# Patient Record
Sex: FEMALE | Race: WHITE | NOT HISPANIC OR LATINO | Employment: OTHER | URBAN - METROPOLITAN AREA
[De-identification: names, ages, dates, MRNs, and addresses within clinical notes are randomized per-mention and may not be internally consistent; named-entity substitution may affect disease eponyms.]

---

## 2022-05-13 ENCOUNTER — HOSPITAL ENCOUNTER (EMERGENCY)
Facility: HOSPITAL | Age: 47
Discharge: LEFT BEFORE DISCHARGE | End: 2022-05-13

## 2022-05-13 VITALS
WEIGHT: 102 LBS | OXYGEN SATURATION: 98 % | HEART RATE: 70 BPM | RESPIRATION RATE: 18 BRPM | SYSTOLIC BLOOD PRESSURE: 137 MMHG | DIASTOLIC BLOOD PRESSURE: 94 MMHG | TEMPERATURE: 98 F | HEIGHT: 63 IN | BODY MASS INDEX: 18.07 KG/M2

## 2022-05-13 PROCEDURE — 99211 OFF/OP EST MAY X REQ PHY/QHP: CPT

## 2022-06-29 ENCOUNTER — PREP FOR SURGERY (OUTPATIENT)
Dept: SURGERY | Facility: SURGERY CENTER | Age: 47
End: 2022-06-29

## 2022-06-29 ENCOUNTER — OFFICE VISIT (OUTPATIENT)
Dept: GASTROENTEROLOGY | Facility: CLINIC | Age: 47
End: 2022-06-29

## 2022-06-29 VITALS
HEIGHT: 63 IN | SYSTOLIC BLOOD PRESSURE: 140 MMHG | WEIGHT: 102.1 LBS | HEART RATE: 73 BPM | TEMPERATURE: 96.9 F | OXYGEN SATURATION: 97 % | BODY MASS INDEX: 18.09 KG/M2 | DIASTOLIC BLOOD PRESSURE: 80 MMHG

## 2022-06-29 DIAGNOSIS — Z12.11 ENCOUNTER FOR SCREENING COLONOSCOPY: ICD-10-CM

## 2022-06-29 DIAGNOSIS — Z80.0 FAMILY HISTORY OF COLON CANCER IN MOTHER: ICD-10-CM

## 2022-06-29 DIAGNOSIS — R63.4 WEIGHT LOSS: ICD-10-CM

## 2022-06-29 DIAGNOSIS — R63.4 WEIGHT LOSS: Primary | ICD-10-CM

## 2022-06-29 DIAGNOSIS — K92.0 HEMATEMESIS WITH NAUSEA: ICD-10-CM

## 2022-06-29 DIAGNOSIS — K92.0 HEMATEMESIS WITH NAUSEA: Primary | ICD-10-CM

## 2022-06-29 PROCEDURE — 99204 OFFICE O/P NEW MOD 45 MIN: CPT | Performed by: INTERNAL MEDICINE

## 2022-06-29 RX ORDER — SODIUM CHLORIDE, SODIUM LACTATE, POTASSIUM CHLORIDE, CALCIUM CHLORIDE 600; 310; 30; 20 MG/100ML; MG/100ML; MG/100ML; MG/100ML
30 INJECTION, SOLUTION INTRAVENOUS CONTINUOUS PRN
Status: CANCELLED | OUTPATIENT
Start: 2022-06-29

## 2022-06-29 RX ORDER — ALCLOMETASONE DIPROPIONATE 0.5 MG/G
CREAM TOPICAL
COMMUNITY
Start: 2022-04-12

## 2022-06-29 RX ORDER — BACLOFEN 5 MG/1
1 TABLET ORAL EVERY 12 HOURS
COMMUNITY
Start: 2022-05-25

## 2022-06-29 RX ORDER — OMEPRAZOLE 40 MG/1
40 CAPSULE, DELAYED RELEASE ORAL DAILY
Qty: 30 CAPSULE | Refills: 5 | Status: SHIPPED | OUTPATIENT
Start: 2022-06-29 | End: 2022-10-03

## 2022-06-29 RX ORDER — ASPIRIN 81 MG/1
81 TABLET, COATED ORAL DAILY
COMMUNITY
Start: 2022-05-17

## 2022-06-29 RX ORDER — ATORVASTATIN CALCIUM 40 MG/1
40 TABLET, FILM COATED ORAL
COMMUNITY
Start: 2022-05-17

## 2022-06-29 RX ORDER — SODIUM CHLORIDE 0.9 % (FLUSH) 0.9 %
10 SYRINGE (ML) INJECTION AS NEEDED
Status: CANCELLED | OUTPATIENT
Start: 2022-06-29

## 2022-06-29 RX ORDER — SODIUM CHLORIDE 0.9 % (FLUSH) 0.9 %
3 SYRINGE (ML) INJECTION EVERY 12 HOURS SCHEDULED
Status: CANCELLED | OUTPATIENT
Start: 2022-06-29

## 2022-06-29 NOTE — PATIENT INSTRUCTIONS
For further evaluation of weight loss, hematemesis, family history of colon cancer in your mother we will schedule an EGD and colonoscopy.     2.  For abdominal pain/epigastric pain we will place you omeprazole 40 mg once daily. This prescription has been sent to your pharmacy.     3.  Plan for office follow up 4 weeks after procedure.

## 2022-06-29 NOTE — PROGRESS NOTES
"Chief Complaint   Patient presents with   • Weight Loss   • hematemesis           History of Present Illness  Patient for consultation regarding hematemesis and weight loss which is new and unexplained. She reports losing 50 lbs in the past 1-1.5 months. She reports having a MI and COVID. She reports hematemesis and was not responsive and was taken to the hospital for evaluation. She denies any melena or hematochezia.     She does not have an appetite. Food makes her sick to her stomach. The past 2-3 days she has been able to eat. She reports some epigastric pain that can be severe at times. She feels like her \"stomach is turning upside down\". She reports taking a PPI in the past which helped her at that time.     She has never had a colonoscopy. She reports an aunt with a history of colon cancer. She reports a history of Crohn's disease and colon cancer in her mother. She reports a personal history of Stage 3 breast cancer in 2012.     She also reports RLQ abdominal pain.        Result Review :       XR CHEST 1 VW PORTABLE (08/04/2021 20:25)  PROGRESS NOTES - SCAN by [employer], [employer] (05/25/2022 00:00)  PROGRESS NOTES - SCAN by [employer] (03/18/2022 00:00)      Vital Signs:   /80   Pulse 73   Temp 96.9 °F (36.1 °C)   Ht 160 cm (63\")   Wt 46.3 kg (102 lb 1.6 oz)   SpO2 97%   BMI 18.09 kg/m²     Body mass index is 18.09 kg/m².     Physical Exam  Vitals reviewed.   Constitutional:       Appearance: Normal appearance.   Abdominal:      General: Bowel sounds are normal. There is no distension.      Palpations: Abdomen is soft. Abdomen is not rigid. There is no mass or pulsatile mass.      Tenderness: There is no abdominal tenderness. There is no guarding or rebound.           Assessment and Plan    Diagnoses and all orders for this visit:    1. Hematemesis with nausea (Primary)    2. Weight loss  Comments:  unintentional    3. Encounter for screening colonoscopy    4. Family history of colon " cancer in mother    Other orders  -     omeprazole (priLOSEC) 40 MG capsule; Take 1 capsule by mouth Daily.  Dispense: 30 capsule; Refill: 5       Documentation by Mckenna BYERS acting as a scribe in the following sections (HPI, Assessment, Plan) for the undersigned provider.     BRIEF SUMMARY  Patient here for consultation regarding new unexplained episode of hematemesis with weight loss.  She also is a family history of colon cancer.  We will proceed with an EGD and colonoscopy for further evaluation.  The meantime we will start daily PPI and antireflux measures.    I have reviewed and confirmed the accuracy of the HPI and Assessment and Plan as documented by the APRN ZEESHAN Davey        Follow Up   No follow-ups on file.    Patient Instructions   1. For further evaluation of weight loss, hematemesis, family history of colon cancer in your mother we will schedule an EGD and colonoscopy.     2.  For abdominal pain/epigastric pain we will place you omeprazole 40 mg once daily. This prescription has been sent to your pharmacy.     3.  Plan for office follow up 4 weeks after procedure.

## 2022-07-25 ENCOUNTER — TELEPHONE (OUTPATIENT)
Dept: GASTROENTEROLOGY | Facility: CLINIC | Age: 47
End: 2022-07-25

## 2022-07-25 NOTE — TELEPHONE ENCOUNTER
Patient called stating that she had had her stress test.   There was nothing in chart about stress test

## 2022-09-23 ENCOUNTER — OUTSIDE FACILITY SERVICE (OUTPATIENT)
Dept: GASTROENTEROLOGY | Facility: CLINIC | Age: 47
End: 2022-09-23

## 2022-09-23 ENCOUNTER — PREP FOR SURGERY (OUTPATIENT)
Dept: SURGERY | Facility: SURGERY CENTER | Age: 47
End: 2022-09-23

## 2022-09-23 DIAGNOSIS — Z12.11 ENCOUNTER FOR SCREENING COLONOSCOPY: ICD-10-CM

## 2022-09-23 DIAGNOSIS — Z80.0 FAMILY HISTORY OF COLON CANCER IN MOTHER: Primary | ICD-10-CM

## 2022-09-23 DIAGNOSIS — R63.4 WEIGHT LOSS: ICD-10-CM

## 2022-09-23 PROCEDURE — 43239 EGD BIOPSY SINGLE/MULTIPLE: CPT | Performed by: INTERNAL MEDICINE

## 2022-09-23 RX ORDER — SODIUM CHLORIDE 0.9 % (FLUSH) 0.9 %
10 SYRINGE (ML) INJECTION AS NEEDED
Status: CANCELLED | OUTPATIENT
Start: 2022-09-23

## 2022-09-23 RX ORDER — SODIUM CHLORIDE, SODIUM LACTATE, POTASSIUM CHLORIDE, CALCIUM CHLORIDE 600; 310; 30; 20 MG/100ML; MG/100ML; MG/100ML; MG/100ML
30 INJECTION, SOLUTION INTRAVENOUS CONTINUOUS PRN
Status: CANCELLED | OUTPATIENT
Start: 2022-09-23

## 2022-09-23 RX ORDER — SODIUM CHLORIDE 0.9 % (FLUSH) 0.9 %
3 SYRINGE (ML) INJECTION EVERY 12 HOURS SCHEDULED
Status: CANCELLED | OUTPATIENT
Start: 2022-09-23

## 2022-09-28 ENCOUNTER — TELEPHONE (OUTPATIENT)
Dept: GASTROENTEROLOGY | Facility: CLINIC | Age: 47
End: 2022-09-28

## 2022-09-28 NOTE — TELEPHONE ENCOUNTER
The results from the KRISTI test for H.pylori are in the chart, but we are still awaiting the pathology results for the remaineder of the EGD biopsies. Will contact patient once results are completed and reviewed. damian

## 2022-10-03 DIAGNOSIS — K29.70 GASTRITIS WITHOUT BLEEDING, UNSPECIFIED CHRONICITY, UNSPECIFIED GASTRITIS TYPE: ICD-10-CM

## 2022-10-03 DIAGNOSIS — K20.90 ESOPHAGITIS: Primary | ICD-10-CM

## 2022-10-03 RX ORDER — PANTOPRAZOLE SODIUM 40 MG/1
40 TABLET, DELAYED RELEASE ORAL DAILY
Qty: 90 TABLET | Refills: 3 | Status: SHIPPED | OUTPATIENT
Start: 2022-10-03

## 2023-08-31 ENCOUNTER — OFFICE VISIT (OUTPATIENT)
Dept: CARDIOLOGY | Facility: CLINIC | Age: 48
End: 2023-08-31
Payer: COMMERCIAL

## 2023-08-31 VITALS
RESPIRATION RATE: 18 BRPM | OXYGEN SATURATION: 99 % | HEIGHT: 63 IN | DIASTOLIC BLOOD PRESSURE: 56 MMHG | BODY MASS INDEX: 16.9 KG/M2 | SYSTOLIC BLOOD PRESSURE: 92 MMHG | HEART RATE: 89 BPM | WEIGHT: 95.4 LBS

## 2023-08-31 DIAGNOSIS — R63.4 UNINTENTIONAL WEIGHT LOSS: ICD-10-CM

## 2023-08-31 DIAGNOSIS — Z72.0 TOBACCO USE: ICD-10-CM

## 2023-08-31 DIAGNOSIS — I21.4 NSTEMI, INITIAL EPISODE OF CARE: ICD-10-CM

## 2023-08-31 DIAGNOSIS — I50.21 ACUTE HFREF (HEART FAILURE WITH REDUCED EJECTION FRACTION): Primary | ICD-10-CM

## 2023-08-31 PROCEDURE — 1159F MED LIST DOCD IN RCRD: CPT

## 2023-08-31 PROCEDURE — 1160F RVW MEDS BY RX/DR IN RCRD: CPT

## 2023-08-31 PROCEDURE — 99204 OFFICE O/P NEW MOD 45 MIN: CPT

## 2023-08-31 PROCEDURE — 93000 ELECTROCARDIOGRAM COMPLETE: CPT

## 2023-08-31 RX ORDER — CLOPIDOGREL BISULFATE 75 MG/1
75 TABLET ORAL DAILY
Qty: 90 TABLET | Refills: 0 | Status: SHIPPED | OUTPATIENT
Start: 2023-08-31

## 2023-08-31 RX ORDER — ASPIRIN 81 MG/1
81 TABLET, COATED ORAL DAILY
Qty: 90 TABLET | Refills: 2 | Status: SHIPPED | OUTPATIENT
Start: 2023-08-31

## 2023-08-31 RX ORDER — ATORVASTATIN CALCIUM 40 MG/1
40 TABLET, FILM COATED ORAL NIGHTLY
Qty: 90 TABLET | Refills: 1 | Status: SHIPPED | OUTPATIENT
Start: 2023-08-31

## 2023-08-31 NOTE — PROGRESS NOTES
[facility]  [facility] CARDIOLOGY  [address] [place] [address]  Dept: [phone]  Dept Fax: [phone]    Concepcion Thomas  1975    New Patient Office Note    History of Present Illness:  Concepcion Thomas is a 48 y.o. female who presents to the clinic for Establish Care. She has PMH significant GERD, breast CA with chemotherapy. She is current every day smoker 1-1.5 ppd x 20 yrs, does drink about 1 pint weekly, history of IVDA years ago. Denies significant family history.     She presents today following [place] [facility] [place] [facility], she had elevated troponin's at that time and was sent for heart cath which showed normal coronaries, Echo showed mildly reduced Ef with regional WMA, global hypokinesis. Normal CXR. Otherwise relatively normal labs, UDS positive for opiates with known oral pain medication, - covid test. She was started on medications and discharged to follow cardiology. Today she states she has not had continued reports of chest pain, she states when she did have reports it was mid sternal, sharp, burning occurring at rest and with activity, felt like burning sensation and most recently lasted for hours, she woke from sleep and then noticed the pain come on , associated with vomiting and weakness. She denies all additional complaints, no LE edema, orthopnea, no major complaint of palpitations or SOB. She did state to have NSTEMI about 1 year ago also, did not take medications at that time. She appreciates significant physical and emotional stress recently and has had 50 lbs of unintentional weight loss over the last year which she states has been evaluated but nothing hs been found. She had breast CA with chemo about 10 years ago and states she had normal workup recently. EKG today reveals SR, Hr 70, short WI, T wave abnormality inferior wall, poor R wave septum, without significant change from hospital record. Exam today reveals /65 right,  114/70  left. Today advised it is unlikely her weight loss is coming from cardiac etiology and she should have further workup by PCP. Advised to hydrate well, smoking cessation and decrease Etoh consumption, stress management.She has not started medications ordered at hospital discharge, advised to start medical management.     The following portions of the patient's history were reviewed and updated as appropriate: allergies, current medications, past family history, past medical history, past social history, past surgical history, and problem list.    Medications:  alclometasone  Aspirin Low Dose tablet delayed-release  atorvastatin  Baclofen  clopidogrel  gabapentin  GABAPENTIN PO  HYDROcodone-acetaminophen  metoprolol tartrate  pantoprazole    Subjective  No Known Allergies     Past Medical History:   Diagnosis Date    Cancer     Hypertension     Injury of back     SCIATICA L SIDED       Past Surgical History:   Procedure Laterality Date    BACK SURGERY      BREAST LUMPECTOMY      PORT    BREAST SURGERY       SECTION      TUBAL ABDOMINAL LIGATION         Family History   Problem Relation Age of Onset    Crohn's disease Mother     Colon polyps Mother     Colon cancer Mother     No Known Problems Father     Irritable bowel syndrome Maternal Aunt     Irritable bowel syndrome Maternal Uncle     Ulcerative colitis Neg Hx         Social History     Socioeconomic History    Marital status: Single   Tobacco Use    Smokeless tobacco: Never   Vaping Use    Vaping Use: Never used   Substance and Sexual Activity    Alcohol use: Yes     Comment: OCC    Drug use: Yes     Types: Marijuana    Sexual activity: Defer       Review of Systems   Constitutional:  Positive for fatigue.   Cardiovascular:  Positive for chest pain.   Gastrointestinal:  Positive for nausea.   Psychiatric/Behavioral:  Positive for stress.    All other systems reviewed and are negative.    Cardiovascular Procedures    ECHO/MUGA:   STRESS TESTS:   CARDIAC  "CATH:   DEVICES:   HOLTER:   CT/MRI:   VASCULAR:   CARDIOTHORACIC:     Objective  Vitals:    08/31/23 0854   BP: 92/56   BP Location: Right arm   Patient Position: Sitting   Cuff Size: Adult   Pulse: 89   Resp: 18   SpO2: 99%   Weight: 43.3 kg (95 lb 6.4 oz)   Height: 160 cm (63\")   PainSc:   5   PainLoc: Back       Physical Exam  Vitals reviewed.   Constitutional:       General: Awake.      Appearance: Normal and healthy appearance. Not in distress.   Neck:      Vascular: No JVR. JVD normal.   Pulmonary:      Effort: Pulmonary effort is normal.      Breath sounds: Normal breath sounds. No wheezing. No rhonchi. No rales.   Chest:      Chest wall: Not tender to palpatation.   Cardiovascular:      PMI at left midclavicular line. Normal rate. Regular rhythm. Normal S1. Normal S2.       Murmurs: There is no murmur.      No gallop.  No click. No rub.   Pulses:     Intact distal pulses.   Edema:     Peripheral edema absent.   Abdominal:      General: Bowel sounds are normal.      Palpations: Abdomen is soft.      Tenderness: There is no abdominal tenderness.   Musculoskeletal: Normal range of motion.         General: No tenderness. Skin:     General: Skin is warm and dry.   Neurological:      General: No focal deficit present.      Mental Status: Alert and oriented to person, place and time.   Psychiatric:         Behavior: Behavior is cooperative.        Diagnostic Data    ECG 12 Lead    Date/Time: 8/31/2023 10:31 AM  Performed by: Manju Yap APRN  Authorized by: Manju Yap APRN   Comparison: compared with previous ECG from 8/28/2023  Similar to previous ECG  Rhythm: sinus rhythm  Rate: normal  BPM: 70  QRS axis: normal  Other findings: non-specific ST-T wave changes, low voltage and poor R wave progression    Clinical impression: abnormal EKG      Advance Care Planning        Assessment and Plan  Diagnoses and all orders for this visit:    1. Acute HFrEF (heart failure with reduced ejection " fraction) (Primary)  Recently found at hospital stay, mildly reduced Ef 45%, global hypokinesis. Start metoprolol as prescribed at hospital D/C, will watch and consider additional medications if BP can tolerate.   -     metoprolol tartrate (LOPRESSOR) 25 MG tablet; Take 0.5 tablets by mouth 2 (Two) Times a Day.  Dispense: 90 tablet; Refill: 0    2. NSTEMI, initial episode of care  Elevated troponins, normal coronaries by cath, elevated troponins. Medical management, DAPT x 3 months, statin, toprol.   -     clopidogrel (PLAVIX) 75 MG tablet; Take 1 tablet by mouth Daily.  Dispense: 90 tablet; Refill: 0  -     Aspirin Low Dose 81 MG EC tablet; Take 1 tablet by mouth Daily.  Dispense: 90 tablet; Refill: 2  -     atorvastatin (LIPITOR) 40 MG tablet; Take 1 tablet by mouth Every Night.  Dispense: 90 tablet; Refill: 1    3. Tobacco use  1-1.5 ppd x 20 +, advised cessation, she is not agreeable at this time but will try to decrease.     4. Unintentional weight loss  50 lbs over 1 year, advised to see PCP for further workup.           Return in about 1 month (around 9/30/2023) for Recheck, Manju BYERS.    ZEESHAN Schultz  08/31/2023    Part of this note may be an electronic transcription/translation of spoken language to printed text using the Dragon Dictation System.

## 2023-09-05 ENCOUNTER — TELEPHONE (OUTPATIENT)
Dept: CARDIOLOGY | Facility: CLINIC | Age: 48
End: 2023-09-05
Payer: COMMERCIAL

## 2023-09-05 ENCOUNTER — PATIENT ROUNDING (BHMG ONLY) (OUTPATIENT)
Dept: CARDIOLOGY | Facility: CLINIC | Age: 48
End: 2023-09-05
Payer: COMMERCIAL

## 2023-09-05 NOTE — TELEPHONE ENCOUNTER
Per phone call to pt she had [place] [facility] [place] and would like your input on those.

## 2023-09-05 NOTE — PROGRESS NOTES
September 5, 2023    Hello, may I speak with Concepcion Thomas?    My name is JAEL    I am  with [facility]  [facility] CARDIOLOGY  [address] [place] [address].    Before we get started may I verify your date of birth? 1975    I am calling to officially welcome you to our practice and ask about your recent visit. Is this a good time to talk? yes    Tell me about your visit with us. What things went well?  VERY GOOD, VERY PROFESSIONAL        We're always looking for ways to make our patients' experiences even better. Do you have recommendations on ways we may improve?  no    Overall were you satisfied with your first visit to our practice? yes       I appreciate you taking the time to speak with me today. Is there anything else I can do for you?    YES - WONDERING ABOUT CHOLESTEROL     Thank you, and have a great day.    9/5/23 MG

## 2023-09-06 NOTE — TELEPHONE ENCOUNTER
Phone call to pt to let her know of results and no answer and no voice mail option will retry later.

## 2023-10-11 ENCOUNTER — TELEPHONE (OUTPATIENT)
Dept: CARDIOLOGY | Facility: CLINIC | Age: 48
End: 2023-10-11

## 2023-10-11 DIAGNOSIS — I50.21 ACUTE HFREF (HEART FAILURE WITH REDUCED EJECTION FRACTION): ICD-10-CM

## 2023-10-11 NOTE — TELEPHONE ENCOUNTER
Caller: Concepcion Thomas     Relationship: SELF    Best call back number: [phone] TRY THIS NUMBER FIRST AND IF PT DOES NOT ANSWER CALL THE SECOND ONE   -    [phone]    What is your medical concern? PT BLOOD PRESSURE HAS BEEN ERRATIC FOR THE LAST FEW WEEKS. SHE IS CONCERNED AND WOULD LIKE TO ASK A NURSE WHEN SHE NEEDS TO BE CONCERNED AND GO TO THE DR. HER LAST READING /92 HEART RATE 88, LAST NIGHT IT /90 HEART RATE 125. PLEASE REACH OUT TO PT TO ADVISE.    How long has this issue been going on? A FEW WEEKS NOW    Is your provider already aware of this issue? NO    Have you been treated for this issue? YES

## 2023-10-11 NOTE — TELEPHONE ENCOUNTER
Phone call to pt and she stated she feels fine but want's to see how you feel about it her BP numbers, stated she uses 2 different home machines and will start only using one and I advised she can come in for BP check and bring her home machine but says she may not be able to make it but will keep her appt next week.      PT BLOOD PRESSURE HAS BEEN ERRATIC FOR THE LAST FEW WEEKS. SHE IS CONCERNED AND WOULD LIKE TO ASK A NURSE WHEN SHE NEEDS TO BE CONCERNED AND GO TO THE DR. HER LAST READING /92 HEART RATE 88, LAST NIGHT IT /90 HEART RATE 125. PLEASE REACH OUT TO PT TO ADVISE.

## 2023-10-11 NOTE — TELEPHONE ENCOUNTER
Phone call to pt and she verbally understood to use one cuff and will be here for her appt next week and yes she has started the Metoprolol nut is only taking 12.5mg QD says it wipes her out, also verbalized understanding to go to ER if she feels she needs to and to contact us if she has any more questions or concerns, she may come in for BP check if she can.

## 2023-10-17 ENCOUNTER — TELEPHONE (OUTPATIENT)
Dept: CARDIOLOGY | Facility: CLINIC | Age: 48
End: 2023-10-17

## 2023-10-17 NOTE — TELEPHONE ENCOUNTER
Phone call to pt to ask if she can come in early or would like to reschedule or see another provider this afternoon due to un for seen issues, awaiting return call

## 2023-10-17 NOTE — TELEPHONE ENCOUNTER
Phone call to pt to follow up to see if patient recieved message to change visit and no answer and no voice mail option.

## 2023-10-18 ENCOUNTER — OFFICE VISIT (OUTPATIENT)
Dept: CARDIOLOGY | Facility: CLINIC | Age: 48
End: 2023-10-18
Payer: COMMERCIAL

## 2023-10-18 VITALS
WEIGHT: 94.8 LBS | SYSTOLIC BLOOD PRESSURE: 90 MMHG | TEMPERATURE: 98 F | HEART RATE: 88 BPM | BODY MASS INDEX: 16.8 KG/M2 | RESPIRATION RATE: 18 BRPM | DIASTOLIC BLOOD PRESSURE: 54 MMHG | HEIGHT: 63 IN | OXYGEN SATURATION: 99 %

## 2023-10-18 DIAGNOSIS — Z72.0 TOBACCO USE: ICD-10-CM

## 2023-10-18 DIAGNOSIS — I21.4 NSTEMI, INITIAL EPISODE OF CARE: ICD-10-CM

## 2023-10-18 DIAGNOSIS — I50.21 ACUTE HFREF (HEART FAILURE WITH REDUCED EJECTION FRACTION): Primary | ICD-10-CM

## 2023-10-18 PROCEDURE — 99213 OFFICE O/P EST LOW 20 MIN: CPT

## 2023-10-18 PROCEDURE — 93000 ELECTROCARDIOGRAM COMPLETE: CPT

## 2023-10-18 PROCEDURE — 1159F MED LIST DOCD IN RCRD: CPT

## 2023-10-18 PROCEDURE — 1160F RVW MEDS BY RX/DR IN RCRD: CPT

## 2023-10-18 RX ORDER — ATORVASTATIN CALCIUM 40 MG/1
40 TABLET, FILM COATED ORAL NIGHTLY
Qty: 90 TABLET | Refills: 2 | Status: SHIPPED | OUTPATIENT
Start: 2023-10-18

## 2023-10-18 NOTE — PROGRESS NOTES
[facility]  [facility] CARDIOLOGY  [address] [place] [address]  Dept: [phone]  Dept Fax: [phone]    Concepcion Thomas  1975    Follow Up Office Visit Note    History of Present Illness:  Concepcion Thomas is a 48 y.o. female who presents to the clinic for Follow-up. She is doing well today, no major cardiac complaints, states chest discomfort has improved. She had some complaints regarding her BP, states has been labile with some higher readings at home around 111/97, she states she has been using multiple BP cuffs and getting variable readings, also some lower readings around 100 systolic. She is s/p cath 8/28/23 for NSTEMI with normal coronaries and HFmrEF, on Toprol but only tolerating 12.5 once daily due to low BP, significant complaints during daytime. Also on DAPT, statin of which she is tolerating well, denies bleeding, + bruising. Exam today is good, BP remains low 90/60. She is due to discontinue Plavix end of November and is having colonoscopy December. At this time we are limited on management of her cardiomyopathy due to hypotension. Will add Jardiance today, otherwise continue plan.     Jardiance sample given today Lot [id number], exp Oct 2025.     The following portions of the patient's history were reviewed and updated as appropriate: allergies, current medications, past family history, past medical history, past social history, past surgical history, and problem list.    Medications:  alclometasone  Aspirin Low Dose tablet delayed-release  atorvastatin  Baclofen  clopidogrel  empagliflozin tablet  gabapentin  GABAPENTIN PO  HYDROcodone-acetaminophen  metoprolol tartrate  pantoprazole    Subjective  No Known Allergies     Past Medical History:   Diagnosis Date    Cancer     Hypertension     Injury of back     SCIATICA L SIDED       Past Surgical History:   Procedure Laterality Date    BACK SURGERY      BREAST LUMPECTOMY      PORT    BREAST SURGERY       " SECTION      TUBAL ABDOMINAL LIGATION         Family History   Problem Relation Age of Onset    Crohn's disease Mother     Colon polyps Mother     Colon cancer Mother     No Known Problems Father     Irritable bowel syndrome Maternal Aunt     Irritable bowel syndrome Maternal Uncle     Ulcerative colitis Neg Hx         Social History     Socioeconomic History    Marital status:    Tobacco Use    Smoking status: Every Day     Packs/day: 1     Types: Cigarettes     Start date:     Smokeless tobacco: Never   Vaping Use    Vaping Use: Never used   Substance and Sexual Activity    Alcohol use: Yes     Comment: OCC    Drug use: Yes     Types: Marijuana    Sexual activity: Defer       Review of Systems   All other systems reviewed and are negative.    Cardiovascular Procedures       Objective  Vitals:    10/18/23 1505   BP: 90/54   BP Location: Right arm   Patient Position: Sitting   Cuff Size: Adult   Pulse: 88   Resp: 18   Temp: 98 °F (36.7 °C)   TempSrc: Infrared   SpO2: 99%   Weight: 43 kg (94 lb 12.8 oz)   Height: 160 cm (63\")   PainSc: 0-No pain     Body mass index is 16.79 kg/m².     Physical Exam  Vitals reviewed.   Constitutional:       General: Awake.      Appearance: Normal and healthy appearance. Not in distress.   Neck:      Vascular: No JVR. JVD normal.   Pulmonary:      Effort: Pulmonary effort is normal.      Breath sounds: Normal breath sounds. No wheezing. No rhonchi. No rales.   Chest:      Chest wall: Not tender to palpatation.   Cardiovascular:      PMI at left midclavicular line. Normal rate. Regular rhythm. Normal S1. Normal S2.       Murmurs: There is no murmur.      No gallop.  No click. No rub.   Pulses:     Intact distal pulses.   Edema:     Peripheral edema absent.   Abdominal:      General: Bowel sounds are normal.      Palpations: Abdomen is soft.      Tenderness: There is no abdominal tenderness.   Musculoskeletal: Normal range of motion.         General: No tenderness. " Skin:     General: Skin is warm and dry.   Neurological:      General: No focal deficit present.      Mental Status: Alert and oriented to person, place and time.   Psychiatric:         Behavior: Behavior is cooperative.        Diagnostic Data    ECG 12 Lead    Date/Time: 10/18/2023 3:51 PM  Performed by: Manju Yap APRN    Authorized by: Manju Yap APRN  Comparison: compared with previous ECG from 8/31/2023  Similar to previous ECG  Rhythm: sinus rhythm  Rate: normal  BPM: 71  Conduction: incomplete right bundle branch block  QRS axis: normal  Other findings: non-specific ST-T wave changes    Clinical impression: non-specific ECG      Advance Care Planning          Assessment and Plan  Diagnoses and all orders for this visit:    1. Acute HFrEF (heart failure with reduced ejection fraction) (Primary)  Mildly reduced, Ef 40-50, normal coronaries, on Toprol 12.5 only once daily, asymptomatic today. Will add Jardiance.   -     empagliflozin (Jardiance) 10 MG tablet tablet; Take 1 tablet by mouth Daily.  Dispense: 90 tablet; Refill: 1  -     metoprolol tartrate (LOPRESSOR) 25 MG tablet; Take 0.5 tablets by mouth 2 (Two) Times a Day.  Dispense: 180 tablet; Refill: 1    2. NSTEMI, initial episode of care  8/28/23, cath showed normal coronaries, nonischemic. On DAPT, statin. Continue plavix through November.   -     atorvastatin (LIPITOR) 40 MG tablet; Take 1 tablet by mouth Every Night.  Dispense: 90 tablet; Refill: 2    3. Tobacco use  1.5 ppd x 20 year hx, advised cessation.        Return in about 3 months (around 1/18/2024) for Recheck, Manju BYERS.    ZEESHAN Schultz  10/18/2023    Part of this note may be an electronic transcription/translation of spoken language to printed text using the Dragon Dictation System.

## 2025-06-13 ENCOUNTER — TELEPHONE (OUTPATIENT)
Dept: CARDIOLOGY | Facility: CLINIC | Age: 50
End: 2025-06-13
Payer: COMMERCIAL

## 2025-06-13 NOTE — TELEPHONE ENCOUNTER
I left a message for pt to call back . if pt calls back . pt would have to be seen . Please make her an apt next week .

## 2025-06-13 NOTE — TELEPHONE ENCOUNTER
Caller: Concepcion Thomas    Relationship to patient: Self    Best call back number: [phone]    Chief complaint:     Type of visit: FOLLOW UP/CARDIAC CLEARANCE    Requested date: ASAP POSSIBLE MONDAY 6-16-25     If rescheduling, when is the original appointment:      Additional notes:PATIENT IS SCHEDULED FOR COLONOSCOPY AND ENDOSCOPY ON 6-17-25 . PROVIDER IS NEEDED CARDIAC CLEARANCE FOR THIS PROCEDURE. PATIENT AWARE REQUEST WILL NEED TO BE FAXED OVER.   PATIENT LAST IN OFFICE SEEING ZEESHAN NUNEZ 10/18/23

## 2025-06-13 NOTE — TELEPHONE ENCOUNTER
REQUEST FOR CARDIAC CLEARANCE    Caller name: Concepcion Thomas     Phone Number: [phone]      Surgeon's name: [facility] (DR. RUIZ)    Type of planned surgery: COLONOSCOPY/ENDOSCOPY    Date of planned surgery: 6-17-25    Type of anesthesia: NOT SURE    Have you been experiencing chest pain or shortness of breath? NO     Is your doctor requesting for you to stop any of your medications prior to your surgery?  ASPIRIN / BLOOD THINNERS (PATIENT IS HAS NOT  TAKEN ANY OF HER HEART MEDICATIONS AT ALL THIS YEAR)    Where should we fax the clearance to? PHONE: [phone]     PATIENT GIVEN FAX NUMBER AND AWARE THAT CARDIAC CLEARANCE REQUEST WILL NEED TO BE FAXED OVER.